# Patient Record
(demographics unavailable — no encounter records)

---

## 2025-07-21 NOTE — PHYSICAL EXAM
[de-identified] : Knee (L)  Inspection Skin: normal Effusion: normal Bursa swelling: none  Palpation Tenderness: n/a  Crepitus: none. Defect: none. Popliteal fullness: negative.  Flexion Active ROM: normal Passive ROM: normal    Extension Normal    Straight Leg Raise- can perform Motor strength Flexion: 5/5 Extension: 5/5  Sensory index Normal.  ACL/PCL tests Lachman test: stable Anterior drawer: stable Posterior drawer: stable  MCL/LCL tests MCL laxity: stable LCL laxity: stable  Patellofemoral tests Patellar grind test: negative Patellar apprehension: negative    [de-identified] : XR of L knee Date: 7/15/2025   Views: 3 Performed at [facility]: No Impression: Bones are demineralized with gross arrest lines within the distal femur and proximal tibia. Correlate with vitamin D levels.  These images were personally reviewed with original findings documented as above.

## 2025-07-21 NOTE — DISCUSSION/SUMMARY
[de-identified] : 12 yr old M presenting with L knee pain likely 2/2 vitamin D deficiency with bone demineralization noted on imaging. Discussed diagnosis and plan with patient and father as outlined below. No signs of Osgood-Schlatter's disease. Pain may be also related to PFPS.  Plan 1. Reviewed XR with patient and father. Discussed demineralization likely 2/2 vitamin D +/- other nutritional deficiencies. Reinforced plan to begin vitamin D supplementation as recommended/ordered by pediatrician. Discussed following up vitamin D levels and other workup/treatment with pediatrician. Consider referral to pediatric endocrinologist if vitamin D levels do not improve with initial interventions. Patient and father expressed understanding and agreement with this plan. 2. HEP printout provided to improve muscle strength and support knee function.  3. Follow up 2-3 months as needed for refractory/persistent symptoms.

## 2025-07-21 NOTE — HISTORY OF PRESENT ILLNESS
[de-identified] : 12 yr old M here recently moved to [place] from [facility] ~ 1 year ago presenting today with left knee pain that started three weeks ago. Patient is accompanied by Father. Garret and father both deny any specific trauma or known inciting event prior to onset of pain. They report pain is primarily in the middle of the knee and occurs with knee flexion and going from sitting to standing positions. They deny any clicking, popping, or other associated symptoms. Pain was initially more severe but has improved in recent days after taking 3 days of ibuprofen prior to presenting today.